# Patient Record
Sex: FEMALE | Race: OTHER | NOT HISPANIC OR LATINO
[De-identification: names, ages, dates, MRNs, and addresses within clinical notes are randomized per-mention and may not be internally consistent; named-entity substitution may affect disease eponyms.]

---

## 2019-09-28 ENCOUNTER — TRANSCRIPTION ENCOUNTER (OUTPATIENT)
Age: 59
End: 2019-09-28

## 2020-01-13 ENCOUNTER — APPOINTMENT (OUTPATIENT)
Dept: OTOLARYNGOLOGY | Facility: CLINIC | Age: 60
End: 2020-01-13

## 2022-08-02 ENCOUNTER — APPOINTMENT (OUTPATIENT)
Dept: RADIOLOGY | Facility: IMAGING CENTER | Age: 62
End: 2022-08-02

## 2023-02-17 ENCOUNTER — OUTPATIENT (OUTPATIENT)
Dept: OUTPATIENT SERVICES | Facility: HOSPITAL | Age: 63
LOS: 1 days | End: 2023-02-17
Payer: MEDICAID

## 2023-02-17 VITALS
HEIGHT: 61 IN | DIASTOLIC BLOOD PRESSURE: 60 MMHG | HEART RATE: 71 BPM | TEMPERATURE: 98 F | WEIGHT: 147.93 LBS | SYSTOLIC BLOOD PRESSURE: 100 MMHG | OXYGEN SATURATION: 98 % | RESPIRATION RATE: 16 BRPM

## 2023-02-17 DIAGNOSIS — M21.612 BUNION OF LEFT FOOT: ICD-10-CM

## 2023-02-17 DIAGNOSIS — Z87.42 PERSONAL HISTORY OF OTHER DISEASES OF THE FEMALE GENITAL TRACT: Chronic | ICD-10-CM

## 2023-02-17 LAB
ANION GAP SERPL CALC-SCNC: 10 MMOL/L — SIGNIFICANT CHANGE UP (ref 7–14)
BUN SERPL-MCNC: 14 MG/DL — SIGNIFICANT CHANGE UP (ref 7–23)
CALCIUM SERPL-MCNC: 9.3 MG/DL — SIGNIFICANT CHANGE UP (ref 8.4–10.5)
CHLORIDE SERPL-SCNC: 101 MMOL/L — SIGNIFICANT CHANGE UP (ref 98–107)
CO2 SERPL-SCNC: 27 MMOL/L — SIGNIFICANT CHANGE UP (ref 22–31)
CREAT SERPL-MCNC: 0.51 MG/DL — SIGNIFICANT CHANGE UP (ref 0.5–1.3)
EGFR: 105 ML/MIN/1.73M2 — SIGNIFICANT CHANGE UP
GLUCOSE SERPL-MCNC: 82 MG/DL — SIGNIFICANT CHANGE UP (ref 70–99)
HCT VFR BLD CALC: 45.8 % — HIGH (ref 34.5–45)
HGB BLD-MCNC: 15.3 G/DL — SIGNIFICANT CHANGE UP (ref 11.5–15.5)
MCHC RBC-ENTMCNC: 30.1 PG — SIGNIFICANT CHANGE UP (ref 27–34)
MCHC RBC-ENTMCNC: 33.4 GM/DL — SIGNIFICANT CHANGE UP (ref 32–36)
MCV RBC AUTO: 90 FL — SIGNIFICANT CHANGE UP (ref 80–100)
NRBC # BLD: 0 /100 WBCS — SIGNIFICANT CHANGE UP (ref 0–0)
NRBC # FLD: 0 K/UL — SIGNIFICANT CHANGE UP (ref 0–0)
PLATELET # BLD AUTO: 206 K/UL — SIGNIFICANT CHANGE UP (ref 150–400)
POTASSIUM SERPL-MCNC: 3.4 MMOL/L — LOW (ref 3.5–5.3)
POTASSIUM SERPL-SCNC: 3.4 MMOL/L — LOW (ref 3.5–5.3)
RBC # BLD: 5.09 M/UL — SIGNIFICANT CHANGE UP (ref 3.8–5.2)
RBC # FLD: 12.4 % — SIGNIFICANT CHANGE UP (ref 10.3–14.5)
SODIUM SERPL-SCNC: 138 MMOL/L — SIGNIFICANT CHANGE UP (ref 135–145)
WBC # BLD: 4.31 K/UL — SIGNIFICANT CHANGE UP (ref 3.8–10.5)
WBC # FLD AUTO: 4.31 K/UL — SIGNIFICANT CHANGE UP (ref 3.8–10.5)

## 2023-02-17 PROCEDURE — 93010 ELECTROCARDIOGRAM REPORT: CPT

## 2023-02-17 NOTE — H&P PST ADULT - PROBLEM SELECTOR PLAN 1
61 YO female with h/o HLD, depression, insomnia presenting with left foot Lapidus bunionectomy on 02/27 for preop eval 61 YO female with h/o HLD, depression, insomnia presenting with left foot Lapidus bunionectomy on 02/27/23.  labs pending, ekg in chart.  Preop instructions provided to pt.  Famotidine provided to pt with instructions.  Pt advised to obtain COVID pcr 3 days prior to DOS.

## 2023-02-17 NOTE — H&P PST ADULT - NSICDXPASTMEDICALHX_GEN_ALL_CORE_FT
PAST MEDICAL HISTORY:  Anxiety and depression     H/O insomnia     H/O osteoporosis     H/O uterine prolapse     HLD (hyperlipidemia)

## 2023-02-17 NOTE — H&P PST ADULT - NSANTHOSAYNRD_GEN_A_CORE
Never tested/No. CLAY screening performed.  STOP BANG Legend: 0-2 = LOW Risk; 3-4 = INTERMEDIATE Risk; 5-8 = HIGH Risk

## 2023-02-17 NOTE — H&P PST ADULT - ASSESSMENT
61 YO female with h/o HLD, depression, insomnia presenting with left foot Lapidus bunionectomy on 02/27 for preop eval    labs pending, ekg in chart.  Preop instructions provided to pt.  Famotidine provided to pt with instructions.  Pt advised to obtain COVID pcr 3 days prior to DOS.  61 YO female with h/o HLD, depression, insomnia presenting with left foot Lapidus bunionectomy on 02/27 for preop eval

## 2023-02-17 NOTE — H&P PST ADULT - HISTORY OF PRESENT ILLNESS
63 YO female with h/o HLD, depression, insomnia presenting with left foot Lapidus bunionectomy on 02/27 for preop eval 61 YO female with h/o HLD, depression, insomnia and with pain in her left foot for many yrs, h/o bunion presenting for preop eval to have w left foot Lapidus bunionectomy on 02/27

## 2023-02-17 NOTE — H&P PST ADULT - TEMPERATURE IN CELSIUS (DEGREES C)
36.7

## 2023-02-24 LAB — SARS-COV-2 RNA SPEC QL NAA+PROBE: SIGNIFICANT CHANGE UP

## 2023-02-26 ENCOUNTER — TRANSCRIPTION ENCOUNTER (OUTPATIENT)
Age: 63
End: 2023-02-26

## 2023-02-27 ENCOUNTER — RESULT REVIEW (OUTPATIENT)
Age: 63
End: 2023-02-27

## 2023-02-27 ENCOUNTER — OUTPATIENT (OUTPATIENT)
Dept: OUTPATIENT SERVICES | Facility: HOSPITAL | Age: 63
LOS: 1 days | Discharge: HOME | End: 2023-02-27
Payer: MEDICAID

## 2023-02-27 ENCOUNTER — TRANSCRIPTION ENCOUNTER (OUTPATIENT)
Age: 63
End: 2023-02-27

## 2023-02-27 VITALS
RESPIRATION RATE: 16 BRPM | HEIGHT: 61 IN | OXYGEN SATURATION: 98 % | HEART RATE: 71 BPM | WEIGHT: 147.93 LBS | DIASTOLIC BLOOD PRESSURE: 60 MMHG | SYSTOLIC BLOOD PRESSURE: 100 MMHG | TEMPERATURE: 98 F

## 2023-02-27 VITALS — HEART RATE: 75 BPM

## 2023-02-27 DIAGNOSIS — Z87.42 PERSONAL HISTORY OF OTHER DISEASES OF THE FEMALE GENITAL TRACT: Chronic | ICD-10-CM

## 2023-02-27 DIAGNOSIS — M21.612 BUNION OF LEFT FOOT: ICD-10-CM

## 2023-02-27 PROCEDURE — 88311 DECALCIFY TISSUE: CPT | Mod: 26

## 2023-02-27 PROCEDURE — 73630 X-RAY EXAM OF FOOT: CPT | Mod: 26,LT

## 2023-02-27 PROCEDURE — 88304 TISSUE EXAM BY PATHOLOGIST: CPT | Mod: 26

## 2023-02-27 DEVICE — SCREW CORT LO PROF 3.5X22MM: Type: IMPLANTABLE DEVICE | Site: LEFT | Status: FUNCTIONAL

## 2023-02-27 DEVICE — IMPLANTABLE DEVICE: Type: IMPLANTABLE DEVICE | Site: LEFT | Status: FUNCTIONAL

## 2023-02-27 DEVICE — GRAFT BONE AUGMENT INJ 3ML SYNTHETIC: Type: IMPLANTABLE DEVICE | Site: LEFT | Status: FUNCTIONAL

## 2023-02-27 DEVICE — SCREW LOKG 3.5X12MM: Type: IMPLANTABLE DEVICE | Site: LEFT | Status: FUNCTIONAL

## 2023-02-27 DEVICE — K-WIRE WRIGHT MEDICAL (SINGLE TROCAR) BLUNT 1.4MM X 150MM: Type: IMPLANTABLE DEVICE | Site: LEFT | Status: FUNCTIONAL

## 2023-02-27 DEVICE — K-WIRE WRIGHT MEDICAL 2.5MM X 150MM: Type: IMPLANTABLE DEVICE | Site: LEFT | Status: FUNCTIONAL

## 2023-02-27 DEVICE — K-WIRE STRYKER (SMOOTH) 4MM: Type: IMPLANTABLE DEVICE | Site: LEFT | Status: FUNCTIONAL

## 2023-02-27 DEVICE — SCREW LOKG 3.5X14MM: Type: IMPLANTABLE DEVICE | Site: LEFT | Status: FUNCTIONAL

## 2023-02-27 DEVICE — SCREW LOKG 3.5X16MM: Type: IMPLANTABLE DEVICE | Site: LEFT | Status: FUNCTIONAL

## 2023-02-27 NOTE — ASU DISCHARGE PLAN (ADULT/PEDIATRIC) - ASU DC SPECIAL INSTRUCTIONSFT
Remain non-weight bearing to left foot using crutches   Keep dressing clean dry and intact until follow up  Take pain medication as needed for pain  Follow up with Dr. Berumen within 1 week Remain non-weight bearing to left foot using crutches   Keep dressing clean dry and intact until follow up  Take pain medication as needed for pain  Follow up with Dr. Berumen within 1 week    tylenol given at 6 PM next dose 12 AM

## 2023-02-27 NOTE — ASU DISCHARGE PLAN (ADULT/PEDIATRIC) - CARE PROVIDER_API CALL
Ziyad Berumen  PODIATRIC MEDICINE AND SURGERY  [address], [address]  [place] [address]  Phone: [phone]  Fax: [phone]  Follow Up Time:

## 2023-02-27 NOTE — ASU DISCHARGE PLAN (ADULT/PEDIATRIC) - NS MD DC FALL RISK RISK
For information on Fall & Injury Prevention, visit: [email or web address] OR  [email or web address] OR  https://www.cdc.gov/steadi/patient.html

## 2023-03-07 LAB — SURGICAL PATHOLOGY STUDY: SIGNIFICANT CHANGE UP

## 2024-05-09 RX ORDER — TRAMADOL HYDROCHLORIDE 50 MG/1
1 TABLET ORAL
Qty: 0 | Refills: 0 | DISCHARGE

## 2024-05-09 RX ORDER — ESCITALOPRAM OXALATE 10 MG/1
1 TABLET, FILM COATED ORAL
Qty: 0 | Refills: 0 | DISCHARGE

## 2024-05-09 RX ORDER — CHOLECALCIFEROL (VITAMIN D3) 125 MCG
1 CAPSULE ORAL
Qty: 0 | Refills: 0 | DISCHARGE

## 2024-05-09 RX ORDER — ASPIRIN/CALCIUM CARB/MAGNESIUM 324 MG
0 TABLET ORAL
Qty: 0 | Refills: 0 | DISCHARGE

## 2024-05-09 RX ORDER — ROSUVASTATIN CALCIUM 5 MG/1
1 TABLET ORAL
Qty: 0 | Refills: 0 | DISCHARGE

## 2024-05-09 RX ORDER — ZOLPIDEM TARTRATE 10 MG/1
0 TABLET ORAL
Qty: 0 | Refills: 0 | DISCHARGE

## 2024-05-09 RX ORDER — CALCIUM CARBONATE 500(1250)
0 TABLET ORAL
Qty: 0 | Refills: 0 | DISCHARGE

## (undated) DEVICE — VENODYNE/SCD SLEEVE CALF BARIATRIC

## (undated) DEVICE — DRSG STOCKINETTE TUBULAR 6"

## (undated) DEVICE — SUT VICRYL 2-0 27" FS-1 UNDYED

## (undated) DEVICE — NDL HYPO SAFE 18G X 1.5" (PINK)

## (undated) DEVICE — PACK LIJ BASIC ORTHO

## (undated) DEVICE — CANISTER DISPOSABLE THIN WALL 3000CC

## (undated) DEVICE — DRSG XEROFORM 1 X 8"

## (undated) DEVICE — DRILL BIT WRIGHT MEDICAL CANN 3.0MM

## (undated) DEVICE — DRSG ACE BANDAGE 4" NS

## (undated) DEVICE — SUT VICRYL 3-0 18" PS-2 UNDYED

## (undated) DEVICE — WARMING BLANKET UPPER ADULT

## (undated) DEVICE — VENODYNE/SCD SLEEVE CALF LARGE

## (undated) DEVICE — NDL HYPO REGULAR BEVEL 25G X 1.5" (BLUE)

## (undated) DEVICE — SOL IRR POUR NS 0.9% 500ML

## (undated) DEVICE — SUT VICRYL 4-0 18" PS-2 UNDYED

## (undated) DEVICE — SUT PROLENE 4-0 18" PS-2

## (undated) DEVICE — VENODYNE/SCD SLEEVE CALF MEDIUM

## (undated) DEVICE — TOURNIQUET ESMARK 4"

## (undated) DEVICE — GAUGE DEPTH 4MM BLUE

## (undated) DEVICE — DRSG KLING 4"

## (undated) DEVICE — SAW BLADE MICROAIRE SAGITTAL 9.4MMX25.4MMX0.6MM

## (undated) DEVICE — DRILL BIT WRIGHT MEDICAL 2.5MM

## (undated) DEVICE — DRSG CURITY GAUZE SPONGE 4 X 4" 12-PLY

## (undated) DEVICE — FRAZIER SUCTION TIP 8FR

## (undated) DEVICE — TOURNIQUET CUFF 18" DUAL PORT SINGLE BLADDER LUER LOCK (BLACK)

## (undated) DEVICE — GLV 8 PROTEXIS (WHITE)

## (undated) DEVICE — DRILL BIT STRYKER 2X135MM

## (undated) DEVICE — POSITIONER STRAP ARMBOARD VELCRO TS-30

## (undated) DEVICE — DRILL BIT STRYKER ORTHO CANN 2.6MM

## (undated) DEVICE — SYR LUER LOK 10CC

## (undated) DEVICE — BLADE SURGICAL #15 CARBON

## (undated) DEVICE — ELCTR GROUNDING PAD ADULT COVIDIEN

## (undated) DEVICE — SUT ETHILON 4-0 18" PS-2

## (undated) DEVICE — LABELS BLANK W PEN

## (undated) DEVICE — DRSG MASTISOL

## (undated) DEVICE — PREP CHLORAPREP HI-LITE ORANGE 26ML

## (undated) DEVICE — Device

## (undated) DEVICE — GLV 7.5 PROTEXIS (WHITE)